# Patient Record
Sex: MALE | Race: ASIAN | Employment: FULL TIME | URBAN - METROPOLITAN AREA
[De-identification: names, ages, dates, MRNs, and addresses within clinical notes are randomized per-mention and may not be internally consistent; named-entity substitution may affect disease eponyms.]

---

## 2017-05-11 ENCOUNTER — HOSPITAL ENCOUNTER (OUTPATIENT)
Age: 22
Discharge: HOME | End: 2017-05-11
Attending: FAMILY MEDICINE
Payer: COMMERCIAL

## 2017-05-11 VITALS
RESPIRATION RATE: 18 BRPM | HEART RATE: 79 BPM | HEIGHT: 70 IN | OXYGEN SATURATION: 98 % | DIASTOLIC BLOOD PRESSURE: 78 MMHG | TEMPERATURE: 98 F | BODY MASS INDEX: 27.92 KG/M2 | WEIGHT: 195 LBS | SYSTOLIC BLOOD PRESSURE: 137 MMHG

## 2017-05-11 DIAGNOSIS — J01.90 ACUTE SINUSITIS, RECURRENCE NOT SPECIFIED, UNSPECIFIED LOCATION: Primary | ICD-10-CM

## 2017-05-11 PROCEDURE — 99203 OFFICE O/P NEW LOW 30 MIN: CPT

## 2017-05-11 PROCEDURE — 99204 OFFICE O/P NEW MOD 45 MIN: CPT

## 2017-05-11 RX ORDER — AZITHROMYCIN 250 MG/1
TABLET, FILM COATED ORAL
Qty: 1 PACKAGE | Refills: 0 | Status: SHIPPED | OUTPATIENT
Start: 2017-05-11 | End: 2017-05-16

## 2017-05-11 RX ORDER — FLUTICASONE PROPIONATE 50 MCG
2 SPRAY, SUSPENSION (ML) NASAL DAILY
Qty: 16 G | Refills: 0 | Status: SHIPPED | OUTPATIENT
Start: 2017-05-11 | End: 2017-06-10

## 2017-05-12 NOTE — ED PROVIDER NOTES
Patient Seen in: [facility]    History   Patient presents with:  Sinus Problem    Stated Complaint: cold, stuffy x3 weeks     HPI    Patient is a 23-year-old male.   Coming in today with complaint of congestion, drainage, st swallowing and voice change. Eyes: Negative. Respiratory: Positive for cough. Negative for apnea, choking, chest tightness, shortness of breath, wheezing and stridor. Cardiovascular: Negative. Gastrointestinal: Negative.     Genitourinary: Negat Lymphadenopathy:     He has no cervical adenopathy. Neurological: He is alert and oriented to person, place, and time. Skin: Skin is warm and dry. No rash noted. He is not diaphoretic. No erythema. No pallor. Nursing note and vitals reviewed.

## 2019-08-21 ENCOUNTER — OFFICE VISIT (OUTPATIENT)
Dept: INTEGRATIVE MEDICINE | Facility: CLINIC | Age: 24
End: 2019-08-21
Payer: COMMERCIAL

## 2019-08-21 VITALS
WEIGHT: 218 LBS | OXYGEN SATURATION: 96 % | HEART RATE: 72 BPM | SYSTOLIC BLOOD PRESSURE: 120 MMHG | DIASTOLIC BLOOD PRESSURE: 80 MMHG | BODY MASS INDEX: 31.21 KG/M2 | HEIGHT: 70 IN

## 2019-08-21 DIAGNOSIS — E03.8 HYPOTHYROIDISM DUE TO HASHIMOTO'S THYROIDITIS: ICD-10-CM

## 2019-08-21 DIAGNOSIS — E06.3 HYPOTHYROIDISM DUE TO HASHIMOTO'S THYROIDITIS: ICD-10-CM

## 2019-08-21 DIAGNOSIS — E66.9 OBESITY (BMI 30-39.9): ICD-10-CM

## 2019-08-21 DIAGNOSIS — E06.3 HASHIMOTO'S THYROIDITIS: Primary | ICD-10-CM

## 2019-08-21 DIAGNOSIS — Z01.89 ENCOUNTER FOR ROUTINE LABORATORY TESTING: ICD-10-CM

## 2019-08-21 PROCEDURE — 99204 OFFICE O/P NEW MOD 45 MIN: CPT | Performed by: FAMILY MEDICINE

## 2019-08-21 RX ORDER — LEVOTHYROXINE, LIOTHYRONINE 19; 4.5 UG/1; UG/1
TABLET ORAL
Refills: 0 | COMMUNITY
Start: 2019-07-19 | End: 2019-10-18

## 2019-08-21 NOTE — PROGRESS NOTES
Yanely Valencia is a 30year old male. Patient presents with:  Thyroid Problem  Establish Care      HPI:     Found to have Hashimoto's a few years ago. Noticed mood swings when not using. Was on Nature-throid, now using NP thyroid for the past few weeks. organization: Not on file        Attends meetings of clubs or organizations: Not on file        Relationship status: Not on file      Intimate partner violence:        Fear of current or ex partner: Not on file        Emotionally abused: Not on file PANEL (14)  - CBC WITH DIFFERENTIAL WITH PLATELET  - HEMOGLOBIN A1C  - LIPID PANEL  - VITAMIN D, 25-HYDROXY  - VITAMIN B6  - VITAMIN B12  - FREE T4 (FREE THYROXINE)  - ASSAY, THYROID STIM HORMONE  - THYROID PEROXIDASE (TPO) AB  - THYROID ANTITHYROGLOBULIN

## 2019-10-18 DIAGNOSIS — E06.3 HYPOTHYROIDISM DUE TO HASHIMOTO'S THYROIDITIS: ICD-10-CM

## 2019-10-18 DIAGNOSIS — E06.3 HASHIMOTO'S THYROIDITIS: Primary | ICD-10-CM

## 2019-10-18 DIAGNOSIS — E06.3 HASHIMOTO'S THYROIDITIS: ICD-10-CM

## 2019-10-18 DIAGNOSIS — E03.8 HYPOTHYROIDISM DUE TO HASHIMOTO'S THYROIDITIS: ICD-10-CM

## 2019-10-18 RX ORDER — LEVOTHYROXINE, LIOTHYRONINE 19; 4.5 UG/1; UG/1
TABLET ORAL
Qty: 30 TABLET | Refills: 0 | Status: SHIPPED | OUTPATIENT
Start: 2019-10-18 | End: 2019-10-21

## 2019-10-21 RX ORDER — LEVOTHYROXINE, LIOTHYRONINE 19; 4.5 UG/1; UG/1
TABLET ORAL
Qty: 30 TABLET | Refills: 0 | Status: SHIPPED | OUTPATIENT
Start: 2019-10-21 | End: 2019-12-06

## 2019-12-06 DIAGNOSIS — E06.3 HASHIMOTO'S THYROIDITIS: ICD-10-CM

## 2019-12-06 DIAGNOSIS — E03.8 HYPOTHYROIDISM DUE TO HASHIMOTO'S THYROIDITIS: ICD-10-CM

## 2019-12-06 DIAGNOSIS — E06.3 HYPOTHYROIDISM DUE TO HASHIMOTO'S THYROIDITIS: ICD-10-CM

## 2019-12-06 RX ORDER — LEVOTHYROXINE, LIOTHYRONINE 19; 4.5 UG/1; UG/1
TABLET ORAL
Qty: 30 TABLET | Refills: 0 | Status: SHIPPED | OUTPATIENT
Start: 2019-12-06 | End: 2020-01-10

## 2019-12-06 NOTE — TELEPHONE ENCOUNTER
Patient left message for clinic for prescription refill; NP Thyroid. Confirmed pharmacy [facility].  Thank you

## 2019-12-12 DIAGNOSIS — E06.3 HYPOTHYROIDISM DUE TO HASHIMOTO'S THYROIDITIS: ICD-10-CM

## 2019-12-12 DIAGNOSIS — E06.3 HASHIMOTO'S THYROIDITIS: ICD-10-CM

## 2019-12-12 DIAGNOSIS — E03.8 HYPOTHYROIDISM DUE TO HASHIMOTO'S THYROIDITIS: ICD-10-CM

## 2020-01-10 ENCOUNTER — TELEPHONE (OUTPATIENT)
Dept: INTEGRATIVE MEDICINE | Facility: CLINIC | Age: 25
End: 2020-01-10

## 2020-01-10 DIAGNOSIS — E03.8 HYPOTHYROIDISM DUE TO HASHIMOTO'S THYROIDITIS: ICD-10-CM

## 2020-01-10 DIAGNOSIS — E06.3 HASHIMOTO'S THYROIDITIS: ICD-10-CM

## 2020-01-10 DIAGNOSIS — E06.3 HYPOTHYROIDISM DUE TO HASHIMOTO'S THYROIDITIS: ICD-10-CM

## 2020-01-10 RX ORDER — LEVOTHYROXINE, LIOTHYRONINE 19; 4.5 UG/1; UG/1
TABLET ORAL
Qty: 30 TABLET | Refills: 0 | Status: SHIPPED | OUTPATIENT
Start: 2020-01-10 | End: 2020-03-04

## 2020-03-04 ENCOUNTER — TELEPHONE (OUTPATIENT)
Dept: INTEGRATIVE MEDICINE | Facility: CLINIC | Age: 25
End: 2020-03-04

## 2020-03-04 DIAGNOSIS — E06.3 HYPOTHYROIDISM DUE TO HASHIMOTO'S THYROIDITIS: ICD-10-CM

## 2020-03-04 DIAGNOSIS — E06.3 HASHIMOTO'S THYROIDITIS: ICD-10-CM

## 2020-03-04 DIAGNOSIS — E03.8 HYPOTHYROIDISM DUE TO HASHIMOTO'S THYROIDITIS: ICD-10-CM

## 2020-03-04 RX ORDER — LEVOTHYROXINE, LIOTHYRONINE 19; 4.5 UG/1; UG/1
TABLET ORAL
Qty: 30 TABLET | Refills: 0 | Status: SHIPPED | OUTPATIENT
Start: 2020-03-04 | End: 2020-03-16

## 2020-03-04 NOTE — TELEPHONE ENCOUNTER
Patient called and asked for RX to be sent to [facility] in [address], [address] Jessica Jamison RN approved order and sent to [facility]

## 2020-03-16 ENCOUNTER — TELEPHONE (OUTPATIENT)
Dept: INTEGRATIVE MEDICINE | Facility: CLINIC | Age: 25
End: 2020-03-16

## 2020-03-16 DIAGNOSIS — E06.3 HYPOTHYROIDISM DUE TO HASHIMOTO'S THYROIDITIS: ICD-10-CM

## 2020-03-16 DIAGNOSIS — E03.8 HYPOTHYROIDISM DUE TO HASHIMOTO'S THYROIDITIS: ICD-10-CM

## 2020-03-16 DIAGNOSIS — E06.3 HASHIMOTO'S THYROIDITIS: ICD-10-CM

## 2020-03-16 RX ORDER — LEVOTHYROXINE, LIOTHYRONINE 19; 4.5 UG/1; UG/1
TABLET ORAL
Qty: 90 TABLET | Refills: 0 | Status: SHIPPED | OUTPATIENT
Start: 2020-03-16 | End: 2020-09-15

## 2020-05-27 ENCOUNTER — OFFICE VISIT (OUTPATIENT)
Dept: SURGERY | Facility: CLINIC | Age: 25
End: 2020-05-27
Payer: COMMERCIAL

## 2020-05-27 VITALS
WEIGHT: 222 LBS | HEART RATE: 92 BPM | BODY MASS INDEX: 31.78 KG/M2 | HEIGHT: 70 IN | SYSTOLIC BLOOD PRESSURE: 121 MMHG | TEMPERATURE: 98 F | DIASTOLIC BLOOD PRESSURE: 82 MMHG

## 2020-05-27 DIAGNOSIS — K40.90 RIGHT INGUINAL HERNIA: Primary | ICD-10-CM

## 2020-05-27 PROCEDURE — 99203 OFFICE O/P NEW LOW 30 MIN: CPT | Performed by: SURGERY

## 2020-05-27 NOTE — H&P
New Patient Visit Note       Active Problems      1.  Right inguinal hernia        Chief Complaint   Patient presents with:  Hernia: pt c/o of right ing hernia bulge onset 1-2 months      History of Present Illness     The patient presents for evaluation of The Review of Systems has been reviewed by me during today.   Review of Systems    Physical Findings   /82   Pulse 92   Temp 98 °F (36.7 °C) (Oral)   Ht 70\"   Wt 222 lb (100.7 kg)   BMI 31.85 kg/m²   Physical Exam   Constitutional: He is oriented to · The patient will follow-up with me after the ultrasound is complete to discuss findings and further management strategies. ·   · The patient was provided ample opportunity to ask questions. · All of the patient's questions were answered in detail.   · T

## 2020-09-02 ENCOUNTER — PATIENT MESSAGE (OUTPATIENT)
Dept: INTEGRATIVE MEDICINE | Facility: CLINIC | Age: 25
End: 2020-09-02

## 2020-09-02 DIAGNOSIS — E06.3 HYPOTHYROIDISM DUE TO HASHIMOTO'S THYROIDITIS: Primary | ICD-10-CM

## 2020-09-02 DIAGNOSIS — E03.8 HYPOTHYROIDISM DUE TO HASHIMOTO'S THYROIDITIS: Primary | ICD-10-CM

## 2020-09-02 DIAGNOSIS — E06.3 HASHIMOTO'S THYROIDITIS: ICD-10-CM

## 2020-09-08 NOTE — TELEPHONE ENCOUNTER
From: Susan Krishnamurthy  To: Dionicio Ballesteros DO  Sent: 9/2/2020 7:48 PM CDT  Subject: Visit Evita Mae -     I have an appointment scheduled for 9/14, I was wondering if I could get my labs done before my appointment.      Susan

## 2020-09-14 ENCOUNTER — OFFICE VISIT (OUTPATIENT)
Dept: INTEGRATIVE MEDICINE | Facility: CLINIC | Age: 25
End: 2020-09-14
Payer: COMMERCIAL

## 2020-09-14 VITALS
BODY MASS INDEX: 33.24 KG/M2 | DIASTOLIC BLOOD PRESSURE: 70 MMHG | SYSTOLIC BLOOD PRESSURE: 120 MMHG | HEART RATE: 90 BPM | OXYGEN SATURATION: 99 % | WEIGHT: 232.19 LBS | HEIGHT: 70 IN

## 2020-09-14 DIAGNOSIS — E06.3 HYPOTHYROIDISM DUE TO HASHIMOTO'S THYROIDITIS: ICD-10-CM

## 2020-09-14 DIAGNOSIS — E03.8 HYPOTHYROIDISM DUE TO HASHIMOTO'S THYROIDITIS: ICD-10-CM

## 2020-09-14 DIAGNOSIS — K40.90 RIGHT INGUINAL HERNIA: ICD-10-CM

## 2020-09-14 DIAGNOSIS — E66.9 OBESITY (BMI 30-39.9): ICD-10-CM

## 2020-09-14 DIAGNOSIS — E06.3 HASHIMOTO'S THYROIDITIS: ICD-10-CM

## 2020-09-14 DIAGNOSIS — Z00.00 ROUTINE MEDICAL EXAM: Primary | ICD-10-CM

## 2020-09-14 PROCEDURE — 99395 PREV VISIT EST AGE 18-39: CPT | Performed by: FAMILY MEDICINE

## 2020-09-14 PROCEDURE — 3074F SYST BP LT 130 MM HG: CPT | Performed by: FAMILY MEDICINE

## 2020-09-14 PROCEDURE — 3078F DIAST BP <80 MM HG: CPT | Performed by: FAMILY MEDICINE

## 2020-09-14 PROCEDURE — 3008F BODY MASS INDEX DOCD: CPT | Performed by: FAMILY MEDICINE

## 2020-09-14 NOTE — PROGRESS NOTES
Silvana Carrion is a 30year old male. Patient presents with:  Physical      HPI:     Exercise - walking; running sometimes. Diet - decent, cooking more at home. Sleep - good; good quality. Digestion - good.        REVIEW OF SYSTEMS:   Review of Systems file      Highest education level: Not on file    Occupational History      Not on file    Social Needs      Financial resource strain: Not on file      Food insecurity:        Worry: Not on file        Inability: Not on file      Transportation needs: Neck: Normal range of motion. Neck supple. No thyromegaly present. Cardiovascular: Normal rate, regular rhythm, normal heart sounds and intact distal pulses. Pulmonary/Chest: Effort normal and breath sounds normal. No respiratory distress.  He has no minimizing highly concentrated carbohydrate foods    Patient affirmed understanding of plan and all questions were answered.      Sam Jewell, DO

## 2020-09-15 DIAGNOSIS — E06.3 HYPOTHYROIDISM DUE TO HASHIMOTO'S THYROIDITIS: ICD-10-CM

## 2020-09-15 DIAGNOSIS — E06.3 HASHIMOTO'S THYROIDITIS: ICD-10-CM

## 2020-09-15 DIAGNOSIS — E03.8 HYPOTHYROIDISM DUE TO HASHIMOTO'S THYROIDITIS: ICD-10-CM

## 2020-09-15 RX ORDER — LEVOTHYROXINE, LIOTHYRONINE 19; 4.5 UG/1; UG/1
TABLET ORAL
Qty: 90 TABLET | Refills: 0 | Status: SHIPPED | OUTPATIENT
Start: 2020-09-15 | End: 2020-11-03

## 2020-09-15 NOTE — TELEPHONE ENCOUNTER
A refill request was received for:  Requested Prescriptions     Pending Prescriptions Disp Refills   • NP THYROID 30 MG Oral Tab 90 tablet 0     Sig: TK 1 T PO QD     Last refill date: 3/16/20  Qty: 90 tabs  Last office visit: 9/14/20  When is follow up du

## 2020-11-03 ENCOUNTER — TELEPHONE (OUTPATIENT)
Dept: INTEGRATIVE MEDICINE | Facility: CLINIC | Age: 25
End: 2020-11-03

## 2020-11-03 DIAGNOSIS — E06.3 HASHIMOTO'S THYROIDITIS: ICD-10-CM

## 2020-11-03 DIAGNOSIS — E03.8 HYPOTHYROIDISM DUE TO HASHIMOTO'S THYROIDITIS: ICD-10-CM

## 2020-11-03 DIAGNOSIS — E06.3 HYPOTHYROIDISM DUE TO HASHIMOTO'S THYROIDITIS: ICD-10-CM

## 2020-11-03 RX ORDER — LEVOTHYROXINE, LIOTHYRONINE 19; 4.5 UG/1; UG/1
TABLET ORAL
Qty: 90 TABLET | Refills: 3 | Status: SHIPPED | OUTPATIENT
Start: 2020-11-03 | End: 2021-12-22

## 2020-12-29 ENCOUNTER — TELEPHONE (OUTPATIENT)
Dept: INTEGRATIVE MEDICINE | Facility: CLINIC | Age: 25
End: 2020-12-29

## 2021-12-22 DIAGNOSIS — E06.3 HASHIMOTO'S THYROIDITIS: ICD-10-CM

## 2021-12-22 DIAGNOSIS — E03.8 HYPOTHYROIDISM DUE TO HASHIMOTO'S THYROIDITIS: ICD-10-CM

## 2021-12-22 DIAGNOSIS — E06.3 HYPOTHYROIDISM DUE TO HASHIMOTO'S THYROIDITIS: ICD-10-CM

## 2021-12-22 RX ORDER — LEVOTHYROXINE, LIOTHYRONINE 19; 4.5 UG/1; UG/1
TABLET ORAL
Qty: 30 TABLET | Refills: 0 | Status: SHIPPED | OUTPATIENT
Start: 2021-12-22

## 2022-08-18 NOTE — ED INITIAL ASSESSMENT (HPI)
Outpatient Wound Clinician Visit Note    Chief Complaint:   Chief Complaint   Patient presents with   • Wound     Follow up with Dr. Vance for right foot        Home Care Service:  No    Type of Supervision: Patient seen by provider    Was care transitioned to this department today? No   Was care transitioned from this department today?  No   Hospitalization within the last 30 days (if yes, date of discharge)? No     Arrival Disposition: Wheelchair  Transfer Assist: 1 person  Special Needs: Special Needs List: None    Comments:  Patient arrival information, vital signs and dressing removed by staff member noted.  Patient and Caregiver education was given regarding procedures/therapy planned.  Weight was obtained in clinic.    Additional POCT Services Provided: None      Assessment:  Wound Foot Right Plantar Diabetic Ulcer (Active)   Date First Assessed/Time First Assessed: 08/09/22 0852   Location: Foot  Laterality: Right  Modifier: Plantar  Level of Skin Injury: Full Thickness  Primary Wound Type: Diabetic Ulcer  Wound Approximate Age at First Assessment (Weeks): 40 weeks      Assessments 8/18/2022 11:33 AM   Wound Image     Dressing Assessment Intact;Drainage present   Dressing Activity Changed   Dressing Changed On   08/18/22   Wound Exudate Serosanguineous;No odor;Moderate   Cleansing Agent Normal saline;Hypochlorous acid (e.g. Vashe, Exsept)   Wound Bed/Tissue Type Red   Periwound Condition Erythema, blanchable   Wound Edge Well defined   Wound Status Unchanged   Wound Dressing Packing strips-iodoform;ABD dressing;Gauze roll-plain;Tape-paper   Wound Protection Surgical shoe   Wound Last Measured 08/18/22   Wound Length (cm) 1 cm   Wound Width (cm) 1 cm   Wound Depth (cm) 3 cm   Wound Surface Area (cm^2) 1 cm^2   Wound Volume (cm^3) 3 cm^3   PhotoTaken? Yes   Tunneling #1 Measurement 4   Tunneling Clock-based Location #1 11   Post-Procedure Length (cm) 1 cm   Post-Procedure Width (cm) 1 cm   Post-Procedure Depth  Pt has had sinus, runny and stuffy nose for 2 weeks. Pt states he has tried OTC meds with no relief and is now getting a sinus headache. (cm) 1.5 cm   Post-Procedure Surface Area (cm^2) 1 cm^2   Post-Procedure Volume (cm^3) 1.5 cm^3       Wound Foot Lateral Diabetic Ulcer (Active)   Date First Assessed/Time First Assessed: 08/09/22 0853   Location: Foot  Modifier: Lateral  Level of Skin Injury: Full Thickness  Primary Wound Type: Diabetic Ulcer  Wound Approximate Age at First Assessment (Weeks): 40 weeks      Assessments 8/18/2022 11:33 AM   Wound Image     Dressing Assessment Intact;Drainage present   Dressing Activity Changed   Dressing Changed On   08/18/22   Wound Exudate Moderate;Sanguineous;No odor   Cleansing Agent Normal saline;Hypochlorous acid (e.g. Vashe, Exsept)   Wound Bed/Tissue Type Pink   Periwound Condition Erythema, blanchable   Wound Edge Attached to wound bed   Wound Status Unchanged   Wound Dressing Packing strips-iodoform;ABD dressing;Gauze roll-plain;Tape-paper   Wound Protection Surgical shoe   Wound Last Measured 08/18/22   Wound Length (cm) 0.2 cm   Wound Width (cm) 0.2 cm   Wound Depth (cm) 0.5 cm   Wound Surface Area (cm^2) 0.04 cm^2   Wound Volume (cm^3) 0.02 cm^3   PhotoTaken? Yes   Tunneling #1 Measurement 4   Tunneling Clock-based Location #1 5        Plan:  The below orders were released and performed during the visit:   Complete Wound Care  Every visit  Diagnosis: Diabetic ulcer  Dressing change(s) to be done by: Wound Care Team  Dressing change(s) to be done by: Other (specify)  Other (specify): Patient and family  Dressing frequency: Daily  Dressing change(s) to be done using: Clean Technique  Clean wound with: Normal saline  Clean wound with: Vashe  Dressing type: Other (specify)  Other (specify): Iodoform 1/2 inch packing strip, abd, kerlix, paper tape, surgilast    Order Comments:  Apply lidocaine 2% gel to ulcer bed as needed for patient comfort or predebridement.     Patient presents to clinic today for reassessment by Dr. Vance.  He reports was concerned foot was more red and painful.  He had xray done prior  to visit, Dr. Vance reviewed results.  Upon assessment, erythema was better than it was on Tuesday 8/16/22.  Patient reported taking Cipro and Levaquin, he reports he finished the Levaquin yesterday.  Dr. Vance instructed patient to hold the Cipro until Saturday if pain improved, he can resume Cipro then.  Plan for patient to see Dr. Meade on Tues 8/23/22.      Clinical  Procedures performed this visit:  None    Departure Instruction: Simple D/C (Rx, simple instructions) and Patient Process: Simple    Departure Disposition: Depart with assistance    Follow Up  Return in about 5 days (around 8/23/2022) for Follow up with Dr. Ananth Meade.

## (undated) NOTE — ED AVS SNAPSHOT
[facility]    [address] [place] [address]    Phone:  [phone]    Fax:  [phone]           Mr. Peter Summers   MRN: [id number]    Department:  THE [facility]   Date of Visit: [facility] 1   [phone]       To Check ER Wait Times:  TEXT 'ERwait' to 14962      Click [email or web address]      Or call [phone]    If you have any problems with your follow-up, please call our  at [phone].     Emeterio loya I have read and understand the instructions given to me by my caregivers. 24[facility]Hour Pharmacies        Pharmacy Address Phone Number   [address] ([address]) Fam[facility] Dejesus visit,  view other health information, and more. To sign up or find more information, go to [email or web address] and click on the Sign Up Now link in the [employer] box.      Enter your SCHEDit Activation Code exactly as it appears below along with yo

## (undated) NOTE — LETTER
06/26/20        Susan Krishnamurthy  [address]  [place]      Dear Marisol Le records indicate that you have outstanding lab work and or testing that was ordered for you and has not yet been completed:  Orders Placed This Encounter

## (undated) NOTE — LETTER
09/20/19        Susan Krishnamurthy  [address]  [place]      Dear Jamal Solders records indicate that you have outstanding lab work and or testing that was ordered for you and has not yet been completed:  Orders Placed This Encounter